# Patient Record
Sex: MALE | Race: WHITE
[De-identification: names, ages, dates, MRNs, and addresses within clinical notes are randomized per-mention and may not be internally consistent; named-entity substitution may affect disease eponyms.]

---

## 2018-01-09 ENCOUNTER — HOSPITAL ENCOUNTER (OUTPATIENT)
Dept: HOSPITAL 45 - C.NEUR | Age: 53
Discharge: HOME | End: 2018-01-09
Attending: INTERNAL MEDICINE
Payer: COMMERCIAL

## 2018-01-09 VITALS
HEIGHT: 69.02 IN | BODY MASS INDEX: 29.88 KG/M2 | BODY MASS INDEX: 29.88 KG/M2 | WEIGHT: 201.72 LBS | HEIGHT: 69.02 IN | WEIGHT: 201.72 LBS

## 2018-01-09 VITALS — HEART RATE: 74 BPM | DIASTOLIC BLOOD PRESSURE: 94 MMHG | SYSTOLIC BLOOD PRESSURE: 149 MMHG

## 2018-01-09 DIAGNOSIS — R06.81: ICD-10-CM

## 2018-01-09 DIAGNOSIS — R40.0: ICD-10-CM

## 2018-01-09 DIAGNOSIS — R53.83: Primary | ICD-10-CM

## 2018-01-09 DIAGNOSIS — G47.00: ICD-10-CM

## 2018-01-09 DIAGNOSIS — R06.83: ICD-10-CM

## 2018-02-12 ENCOUNTER — HOSPITAL ENCOUNTER (OUTPATIENT)
Dept: HOSPITAL 45 - C.NEUR | Age: 53
Discharge: HOME | End: 2018-02-12
Attending: INTERNAL MEDICINE
Payer: COMMERCIAL

## 2018-02-12 DIAGNOSIS — R53.83: ICD-10-CM

## 2018-02-12 DIAGNOSIS — R06.81: ICD-10-CM

## 2018-02-12 DIAGNOSIS — R40.0: Primary | ICD-10-CM

## 2018-02-12 DIAGNOSIS — R06.83: ICD-10-CM

## 2018-02-16 NOTE — POLYSOMNOGRAPH REPORT
CLINICAL DATA:  A 52-year-old male with BMI of 29.8, referred by Dr. Trejo and myself with fatigue, snoring, apneic episodes, insomnia and

fragmented sleep.  On the evening of 02/12/2018, a home sleep apnea test was

performed using a [employer] type 3 monitor.

 

RECORDING RESULTS:  Total recording time was 10 hours.  The patient's

monitoring time and estimated sleep time was 9.3 hours.

 

RESPIRATORY DATA:  Mild sleep apnea was documented.  The MICHELLE was 7.2.  There

were 2 obstructive, 1 mixed and 1 central apneic episode.  There were 63

hypopneic episodes.  The longest respiratory event was 43 seconds.

 

OXIMETRY DATA:  Nocturnal hypoxemia was seen.  Oxygen palma was 78%.  Mean

saturation was 90%.  Time below 89% was 149 minutes.

 

HEART RATE DATA:  Heart rates ranged from 48-60 beats per minute.

 

SNORING DATA:  Snoring was recorded throughout the night.

 

TECHNICIAN'S COMMENTS:  Hypopneas and apneas were scattered throughout the

night.  The patient ran a low saturation often in the high 80s to the low 90s

throughout the whole night.

 

IMPRESSION:  Mild sleep apnea/hypopnea with nocturnal hypoxemia.

 

RECOMMENDATIONS:  The patient may benefit from use of an oral appliance,

repeat sleep study with CPAP, uses of auto-CPAP.  Clinical correlation is

needed.

 

 

DOMINICK